# Patient Record
Sex: MALE | Race: WHITE | URBAN - METROPOLITAN AREA
[De-identification: names, ages, dates, MRNs, and addresses within clinical notes are randomized per-mention and may not be internally consistent; named-entity substitution may affect disease eponyms.]

---

## 2021-10-19 ENCOUNTER — APPOINTMENT (RX ONLY)
Dept: URBAN - METROPOLITAN AREA CLINIC 122 | Facility: CLINIC | Age: 31
Setting detail: DERMATOLOGY
End: 2021-10-19

## 2021-10-19 DIAGNOSIS — L71.8 OTHER ROSACEA: ICD-10-CM | Status: INADEQUATELY CONTROLLED

## 2021-10-19 PROCEDURE — ? COUNSELING

## 2021-10-19 PROCEDURE — ? FULL BODY SKIN EXAM - DECLINED

## 2021-10-19 PROCEDURE — ? PRESCRIPTION

## 2021-10-19 PROCEDURE — 99204 OFFICE O/P NEW MOD 45 MIN: CPT

## 2021-10-19 PROCEDURE — ? PRESCRIPTION MEDICATION MANAGEMENT

## 2021-10-19 RX ORDER — METRONIDAZOLE 7.5 MG/G
CREAM TOPICAL
Qty: 45 | Refills: 3 | Status: ERX | COMMUNITY
Start: 2021-10-19

## 2021-10-19 RX ORDER — DOXYCYCLINE HYCLATE 100 MG/1
CAPSULE, GELATIN COATED ORAL
Qty: 30 | Refills: 1 | Status: ERX | COMMUNITY
Start: 2021-10-19

## 2021-10-19 RX ADMIN — METRONIDAZOLE: 7.5 CREAM TOPICAL at 00:00

## 2021-10-19 RX ADMIN — DOXYCYCLINE HYCLATE: 100 CAPSULE, GELATIN COATED ORAL at 00:00

## 2021-10-19 ASSESSMENT — LOCATION SIMPLE DESCRIPTION DERM: LOCATION SIMPLE: LEFT CHEEK

## 2021-10-19 ASSESSMENT — LOCATION DETAILED DESCRIPTION DERM: LOCATION DETAILED: LEFT CENTRAL MALAR CHEEK

## 2021-10-19 ASSESSMENT — LOCATION ZONE DERM: LOCATION ZONE: FACE

## 2021-10-19 NOTE — HPI: RASH
What Type Of Note Output Would You Prefer (Optional)?: Standard Output
Is The Patient Presenting As Previously Scheduled?: No, they are a work-in
How Severe Is Your Rash?: mild
Is This A New Presentation, Or A Follow-Up?: Rash
Additional History: Patient went to [facility] and 2 other dermatologists and was given hydrocortisone 2.5% cream which is not helping.

## 2021-10-19 NOTE — PROCEDURE: PRESCRIPTION MEDICATION MANAGEMENT
Initiate Treatment: Wash with Cetaphil gentle cleanser. Moisturize daily. Restart metronidazole 0.75% cream twice daily given by another dermatologist. Start doxycycline 100 mg daily x 1 month.
Detail Level: Zone
Render In Strict Bullet Format?: No

## 2021-10-19 NOTE — PROCEDURE: MIPS QUALITY
Quality 431: Preventive Care And Screening: Unhealthy Alcohol Use - Screening: Patient identified as an unhealthy alcohol user when screened for unhealthy alcohol use using a systematic screening method and received brief counseling
Quality 111:Pneumonia Vaccination Status For Older Adults: Pneumococcal Vaccination not Administered or Previously Received, Reason not Otherwise Specified
Quality 47: Advance Care Plan: Advance Care Planning discussed and documented; advance care plan or surrogate decision maker documented in the medical record.
Detail Level: Detailed
Quality 226: Preventive Care And Screening: Tobacco Use: Screening And Cessation Intervention: Patient screened for tobacco use and is an ex/non-smoker